# Patient Record
Sex: FEMALE | URBAN - METROPOLITAN AREA
[De-identification: names, ages, dates, MRNs, and addresses within clinical notes are randomized per-mention and may not be internally consistent; named-entity substitution may affect disease eponyms.]

---

## 2023-05-23 ENCOUNTER — TELEPHONE ENCOUNTER (OUTPATIENT)
Dept: URBAN - METROPOLITAN AREA CLINIC 64 | Facility: CLINIC | Age: 48
End: 2023-05-23

## 2023-05-23 ENCOUNTER — P2P PATIENT RECORD (OUTPATIENT)
Age: 48
End: 2023-05-23

## 2023-06-05 ENCOUNTER — OFFICE VISIT (OUTPATIENT)
Dept: URBAN - METROPOLITAN AREA CLINIC 60 | Facility: CLINIC | Age: 48
End: 2023-06-05
Payer: COMMERCIAL

## 2023-06-05 ENCOUNTER — LAB OUTSIDE AN ENCOUNTER (OUTPATIENT)
Dept: URBAN - METROPOLITAN AREA CLINIC 60 | Facility: CLINIC | Age: 48
End: 2023-06-05

## 2023-06-05 VITALS
TEMPERATURE: 97.4 F | HEIGHT: 64 IN | HEART RATE: 114 BPM | OXYGEN SATURATION: 97 % | WEIGHT: 243 LBS | SYSTOLIC BLOOD PRESSURE: 120 MMHG | DIASTOLIC BLOOD PRESSURE: 70 MMHG | BODY MASS INDEX: 41.48 KG/M2 | RESPIRATION RATE: 20 BRPM

## 2023-06-05 DIAGNOSIS — Z12.11 COLON CANCER SCREENING: ICD-10-CM

## 2023-06-05 DIAGNOSIS — D64.9 CHRONIC ANEMIA: ICD-10-CM

## 2023-06-05 DIAGNOSIS — K59.09 OTHER CONSTIPATION: ICD-10-CM

## 2023-06-05 DIAGNOSIS — K64.8 OTHER HEMORRHOIDS: ICD-10-CM

## 2023-06-05 PROCEDURE — 99204 OFFICE O/P NEW MOD 45 MIN: CPT | Performed by: PHYSICIAN ASSISTANT

## 2023-06-05 RX ORDER — ONDANSETRON HYDROCHLORIDE 4 MG/1
1 TABLET TABLET, FILM COATED ORAL
Qty: 2 | Refills: 0 | OUTPATIENT
Start: 2023-06-05

## 2023-06-05 RX ORDER — VILOXAZINE HYDROCHLORIDE 150 MG/1
2 CAPSULES CAPSULE, EXTENDED RELEASE ORAL ONCE A DAY
Status: ACTIVE | COMMUNITY

## 2023-06-05 RX ORDER — HYDROCHLOROTHIAZIDE 12.5 MG/1
1 CAPSULE IN THE MORNING CAPSULE, GELATIN COATED ORAL ONCE A DAY
Status: ACTIVE | COMMUNITY

## 2023-06-05 RX ORDER — LIDOCAINE 50 MG/G
1 APPLICATION AS NEEDED CREAM TOPICAL
Qty: 1 TUBE | Refills: 1 | OUTPATIENT
Start: 2023-06-05 | End: 2023-08-04

## 2023-06-05 RX ORDER — FERROUS SULFATE 325(65) MG
1 TABLET TABLET ORAL
Status: ACTIVE | COMMUNITY

## 2023-06-05 RX ORDER — TRAZODONE HYDROCHLORIDE 150 MG/1
1 TABLET AT BEDTIME TABLET ORAL ONCE A DAY
Status: ACTIVE | COMMUNITY

## 2023-06-05 RX ORDER — SERTRALINE 100 MG/1
1 TABLET TABLET, FILM COATED ORAL TWICE A DAY
Status: ACTIVE | COMMUNITY

## 2023-06-05 RX ORDER — MULTIVITAMIN
1 TABLET TABLET ORAL ONCE A DAY
Status: ACTIVE | COMMUNITY

## 2023-06-05 RX ORDER — POLYETHYLENE GLYCOL 3350, SODIUM CHLORIDE, SODIUM BICARBONATE, POTASSIUM CHLORIDE 420; 11.2; 5.72; 1.48 G/4L; G/4L; G/4L; G/4L
AS DIRECTED POWDER, FOR SOLUTION ORAL ONCE
Qty: 4000 | Refills: 0 | OUTPATIENT
Start: 2023-06-05 | End: 2023-06-06

## 2023-06-05 NOTE — PHYSICAL EXAM CONSTITUTIONAL:
well developed, morbidly obese, in no acute distress , ambulating without difficulty , normal communication ability

## 2023-06-05 NOTE — HPI-HPI
Niesha is a pleasant 47-year-old female who presents today for evaluation of constipation, anemia, hemorrhoids and a screening colonoscopy.  Presented to the urgent care on 3/24/2023 due to symptomatic external hemorrhoids.  Labs dated 5/12/2023 showed ferritin 7 (L).  Normal iron studies.  Normal renal function.  Normal LFTs.  Patient is EGD and colonoscopy naive. History of gastric bypass in 2003. Notes a bowel movement every few days. Pushing and straining. Minimal efficacy with a high fiber diet and fiber supplements. Admits to adequate hydration. Denies nausea, vomiting, heartburn, reflux, dysphagia, odynophagia, hematemesis, coffee ground emesis, abdominal pain, abnormal weight loss or melena. Occasional BRBPR when wiping. Denies family history of colon cancer or colon polyps. No other GI complaints at this time

## 2023-06-12 ENCOUNTER — LAB OUTSIDE AN ENCOUNTER (OUTPATIENT)
Dept: URBAN - METROPOLITAN AREA CLINIC 60 | Facility: CLINIC | Age: 48
End: 2023-06-12

## 2023-06-19 ENCOUNTER — TELEPHONE ENCOUNTER (OUTPATIENT)
Dept: URBAN - METROPOLITAN AREA CLINIC 60 | Facility: CLINIC | Age: 48
End: 2023-06-19

## 2023-07-11 ENCOUNTER — CLAIMS CREATED FROM THE CLAIM WINDOW (OUTPATIENT)
Dept: URBAN - METROPOLITAN AREA SURGERY CENTER 4 | Facility: SURGERY CENTER | Age: 48
End: 2023-07-11
Payer: COMMERCIAL

## 2023-07-11 ENCOUNTER — TELEPHONE ENCOUNTER (OUTPATIENT)
Dept: URBAN - METROPOLITAN AREA CLINIC 63 | Facility: CLINIC | Age: 48
End: 2023-07-11

## 2023-07-11 VITALS — HEIGHT: 64 IN

## 2023-07-11 DIAGNOSIS — K20.90 ESOPHAGITIS: ICD-10-CM

## 2023-07-11 DIAGNOSIS — D12.7 BENIGN NEOPLASM OF RECTOSIGMOID JUNCTION: ICD-10-CM

## 2023-07-11 DIAGNOSIS — D50.9 ANEMIA: ICD-10-CM

## 2023-07-11 DIAGNOSIS — K64.0 GRADE I HEMORRHOIDS: ICD-10-CM

## 2023-07-11 DIAGNOSIS — K62.89 OTHER SPECIFIED DISEASES OF ANUS AND RECTUM: ICD-10-CM

## 2023-07-11 DIAGNOSIS — Z12.11 COLON CANCER SCREENING: ICD-10-CM

## 2023-07-11 DIAGNOSIS — K22.82 ESOPHAGOGASTRIC JUNCTION POLYP: ICD-10-CM

## 2023-07-11 DIAGNOSIS — K63.89 OTHER SPECIFIED DISEASES OF INTESTINE: ICD-10-CM

## 2023-07-11 DIAGNOSIS — K44.9 HIATAL HERNIA: ICD-10-CM

## 2023-07-11 PROCEDURE — 00813 ANES UPR LWR GI NDSC PX: CPT | Performed by: NURSE ANESTHETIST, CERTIFIED REGISTERED

## 2023-07-11 PROCEDURE — 45380 COLONOSCOPY AND BIOPSY: CPT | Performed by: INTERNAL MEDICINE

## 2023-07-11 PROCEDURE — 43239 EGD BIOPSY SINGLE/MULTIPLE: CPT | Performed by: INTERNAL MEDICINE

## 2023-07-11 RX ORDER — MULTIVITAMIN
1 TABLET TABLET ORAL ONCE A DAY
Status: ACTIVE | COMMUNITY

## 2023-07-11 RX ORDER — ONDANSETRON HYDROCHLORIDE 4 MG/1
1 TABLET TABLET, FILM COATED ORAL
Qty: 2 | Refills: 0 | Status: ACTIVE | COMMUNITY
Start: 2023-06-05

## 2023-07-11 RX ORDER — HYDROCHLOROTHIAZIDE 12.5 MG/1
1 CAPSULE IN THE MORNING CAPSULE, GELATIN COATED ORAL ONCE A DAY
Status: ACTIVE | COMMUNITY

## 2023-07-11 RX ORDER — TRAZODONE HYDROCHLORIDE 150 MG/1
1 TABLET AT BEDTIME TABLET ORAL ONCE A DAY
Status: ACTIVE | COMMUNITY

## 2023-07-11 RX ORDER — VILOXAZINE HYDROCHLORIDE 150 MG/1
2 CAPSULES CAPSULE, EXTENDED RELEASE ORAL ONCE A DAY
Status: ACTIVE | COMMUNITY

## 2023-07-11 RX ORDER — SERTRALINE 100 MG/1
1 TABLET TABLET, FILM COATED ORAL TWICE A DAY
Status: ACTIVE | COMMUNITY

## 2023-07-11 RX ORDER — LIDOCAINE 50 MG/G
1 APPLICATION AS NEEDED CREAM TOPICAL
Qty: 1 TUBE | Refills: 1 | Status: ACTIVE | COMMUNITY
Start: 2023-06-05 | End: 2023-08-04

## 2023-07-11 RX ORDER — FAMOTIDINE 40 MG/1
1 TABLET AT BEDTIME TABLET, FILM COATED ORAL ONCE A DAY
Qty: 30 | Refills: 3 | OUTPATIENT
Start: 2023-07-11

## 2023-07-11 RX ORDER — FERROUS SULFATE 325(65) MG
1 TABLET TABLET ORAL
Status: ACTIVE | COMMUNITY

## 2023-07-20 ENCOUNTER — TELEPHONE ENCOUNTER (OUTPATIENT)
Dept: URBAN - METROPOLITAN AREA CLINIC 63 | Facility: CLINIC | Age: 48
End: 2023-07-20

## 2023-07-20 ENCOUNTER — LAB OUTSIDE AN ENCOUNTER (OUTPATIENT)
Dept: URBAN - METROPOLITAN AREA CLINIC 63 | Facility: CLINIC | Age: 48
End: 2023-07-20

## 2023-07-20 LAB
FERRITIN, SERUM: 8
HEMATOCRIT: 39.3
HEMOGLOBIN: 12.6
IRON BIND.CAP.(TIBC): 421
IRON SATURATION: 9
IRON: 39
MCH: 24.4
MCHC: 32.1
MCV: 76
MPV: 9.6
PLATELET COUNT: 270
RDW: 14.5
RED BLOOD CELL COUNT: 5.17
TSH: 1.23
VITAMIN B12: 389
WHITE BLOOD CELL COUNT: 7.6

## 2023-07-31 ENCOUNTER — OFFICE VISIT (OUTPATIENT)
Dept: URBAN - METROPOLITAN AREA CLINIC 60 | Facility: CLINIC | Age: 48
End: 2023-07-31
Payer: COMMERCIAL

## 2023-07-31 VITALS
HEART RATE: 112 BPM | OXYGEN SATURATION: 98 % | TEMPERATURE: 97.9 F | RESPIRATION RATE: 20 BRPM | WEIGHT: 252 LBS | HEIGHT: 64 IN | SYSTOLIC BLOOD PRESSURE: 130 MMHG | BODY MASS INDEX: 43.02 KG/M2 | DIASTOLIC BLOOD PRESSURE: 80 MMHG

## 2023-07-31 DIAGNOSIS — Z12.11 COLON CANCER SCREENING: ICD-10-CM

## 2023-07-31 DIAGNOSIS — K20.90 ESOPHAGITIS: ICD-10-CM

## 2023-07-31 DIAGNOSIS — K64.8 OTHER HEMORRHOIDS: ICD-10-CM

## 2023-07-31 DIAGNOSIS — K59.09 OTHER CONSTIPATION: ICD-10-CM

## 2023-07-31 PROCEDURE — 99213 OFFICE O/P EST LOW 20 MIN: CPT | Performed by: PHYSICIAN ASSISTANT

## 2023-07-31 RX ORDER — VILOXAZINE HYDROCHLORIDE 150 MG/1
2 CAPSULES CAPSULE, EXTENDED RELEASE ORAL ONCE A DAY
Status: ACTIVE | COMMUNITY

## 2023-07-31 RX ORDER — HYDROCHLOROTHIAZIDE 12.5 MG/1
1 CAPSULE IN THE MORNING CAPSULE, GELATIN COATED ORAL ONCE A DAY
Status: ACTIVE | COMMUNITY

## 2023-07-31 RX ORDER — LIDOCAINE 50 MG/G
1 APPLICATION AS NEEDED CREAM TOPICAL
Qty: 1 TUBE | Refills: 1 | OUTPATIENT

## 2023-07-31 RX ORDER — TRAZODONE HYDROCHLORIDE 150 MG/1
1 TABLET AT BEDTIME TABLET ORAL ONCE A DAY
Status: ACTIVE | COMMUNITY

## 2023-07-31 RX ORDER — SERTRALINE 100 MG/1
1 TABLET TABLET, FILM COATED ORAL TWICE A DAY
Status: ACTIVE | COMMUNITY

## 2023-07-31 RX ORDER — FAMOTIDINE 40 MG/1
1 TABLET AT BEDTIME TABLET, FILM COATED ORAL ONCE A DAY
Qty: 30 | Refills: 3 | Status: ACTIVE | COMMUNITY
Start: 2023-07-11

## 2023-07-31 RX ORDER — LIDOCAINE 50 MG/G
1 APPLICATION AS NEEDED CREAM TOPICAL
Qty: 1 TUBE | Refills: 1 | Status: ACTIVE | COMMUNITY
Start: 2023-06-05 | End: 2023-08-04

## 2023-07-31 RX ORDER — FERROUS SULFATE 325(65) MG
1 TABLET TABLET ORAL
Status: ACTIVE | COMMUNITY

## 2023-07-31 RX ORDER — MULTIVITAMIN
1 TABLET TABLET ORAL ONCE A DAY
Status: ACTIVE | COMMUNITY

## 2023-07-31 NOTE — HPI-HPI
Niesha is a pleasant 47-year-old female who presents today for a procedure follow up. Seen last visit in evaluation of constipation, anemia, hemorrhoids and a screening colonoscopy.    EGD dated 7/11/2023 showed nonsevere esophagitis.  Small hiatal hernia.  Gulshan-en-Y gastrojejunostomy with gastrojejunal anastomosis characterized by healthy-appearing mucosa.  Normal examined jejunum.  Gastric pouch approximately 7 cm. GE junction type mucosa demonstrating mild chronic inflammation.  Colonoscopy dated 7/11/2023 showed nonbleeding internal hemorrhoids.  Anal papilla were hypertrophied and biopsied for histology.  Diminutive polyp removed from the rectosigmoid colon. Polypoid benign mucosa.  No definitive dysplasia or malignancy. Diverticulosis in the cecum.  Consider referral to colorectal surgery for further evaluation of hypertrophied papilla.  Fragments of squamous mucosa with papillomatous features negative for high-grade dysplasia or carcinoma noted in the rectal biopsies.  Labs dated 7/20/2023 showed RBC 5.17.  Hemoglobin normal.  MCV 76.  Normal platelets.  Ferritin 8.  Iron saturation 9%.  Total iron 39.  Normal TSH.  Vitamin B12 normal  Labs dated 5/12/2023 showed ferritin 7 (L).  Normal iron studies.  Normal renal function.  Normal LFTs.  No issues after procedure. S/P gastric bypass in 2003. Notes a bowel movement every few days despite supplemental fiber and MiraLAX. Pushing and straining. Appt with CRS pending. Admits to nausea but denies vomiting, heartburn, reflux, dysphagia, odynophagia, hematemesis, coffee ground emesis, abdominal pain, abnormal weight loss or melena. Occasional BRBPR when wiping. Denies family history of colon cancer or colon polyps. No other GI complaints at this time

## 2023-09-25 ENCOUNTER — OFFICE VISIT (OUTPATIENT)
Dept: URBAN - METROPOLITAN AREA CLINIC 60 | Facility: CLINIC | Age: 48
End: 2023-09-25

## 2023-09-25 RX ORDER — MULTIVITAMIN
1 TABLET TABLET ORAL ONCE A DAY
Status: ACTIVE | COMMUNITY

## 2023-09-25 RX ORDER — FAMOTIDINE 40 MG/1
1 TABLET AT BEDTIME TABLET, FILM COATED ORAL ONCE A DAY
Qty: 30 | Refills: 3 | Status: ACTIVE | COMMUNITY
Start: 2023-07-11

## 2023-09-25 RX ORDER — VILOXAZINE HYDROCHLORIDE 150 MG/1
2 CAPSULES CAPSULE, EXTENDED RELEASE ORAL ONCE A DAY
Status: ACTIVE | COMMUNITY

## 2023-09-25 RX ORDER — LIDOCAINE 50 MG/G
1 APPLICATION AS NEEDED CREAM TOPICAL
Qty: 1 TUBE | Refills: 1 | OUTPATIENT

## 2023-09-25 RX ORDER — FERROUS SULFATE 325(65) MG
1 TABLET TABLET ORAL
Status: ACTIVE | COMMUNITY

## 2023-09-25 RX ORDER — HYDROCHLOROTHIAZIDE 12.5 MG/1
1 CAPSULE IN THE MORNING CAPSULE, GELATIN COATED ORAL ONCE A DAY
Status: ACTIVE | COMMUNITY

## 2023-09-25 RX ORDER — LIDOCAINE 50 MG/G
1 APPLICATION AS NEEDED CREAM TOPICAL
Qty: 1 TUBE | Refills: 1 | Status: ACTIVE | COMMUNITY

## 2023-09-25 RX ORDER — SERTRALINE 100 MG/1
1 TABLET TABLET, FILM COATED ORAL TWICE A DAY
Status: ACTIVE | COMMUNITY

## 2023-09-25 RX ORDER — TRAZODONE HYDROCHLORIDE 150 MG/1
1 TABLET AT BEDTIME TABLET ORAL ONCE A DAY
Status: ACTIVE | COMMUNITY

## 2023-09-25 NOTE — HPI-HPI
Niesha is a pleasant 47-year-old female who presents today for a follow up.   EGD dated 7/11/2023 showed nonsevere esophagitis.  Small hiatal hernia.  Gulshan-en-Y gastrojejunostomy with gastrojejunal anastomosis characterized by healthy-appearing mucosa.  Normal examined jejunum.  Gastric pouch approximately 7 cm. GE junction type mucosa demonstrating mild chronic inflammation.  Colonoscopy dated 7/11/2023 showed nonbleeding internal hemorrhoids.  Anal papilla were hypertrophied and biopsied for histology.  Diminutive polyp removed from the rectosigmoid colon. Polypoid benign mucosa.  No definitive dysplasia or malignancy. Diverticulosis in the cecum.  Consider referral to colorectal surgery for further evaluation of hypertrophied papilla.  Fragments of squamous mucosa with papillomatous features negative for high-grade dysplasia or carcinoma noted in the rectal biopsies.  Labs dated 7/20/2023 showed RBC 5.17.  Hemoglobin normal.  MCV 76.  Normal platelets.  Ferritin 8.  Iron saturation 9%.  Total iron 39.  Normal TSH.  Vitamin B12 normal  Labs dated 5/12/2023 showed ferritin 7 (L).  Normal iron studies.  Normal renal function.  Normal LFTs.  Last visit given trial of Linzess 72 mcg. Patient never called to report efficacy. S/P gastric bypass in 2003. Notes a bowel movement every few days despite supplemental fiber and MiraLAX. Pushing and straining. Appt with CRS pending. Admits to nausea but denies vomiting, heartburn, reflux, dysphagia, odynophagia, hematemesis, coffee ground emesis, abdominal pain, abnormal weight loss or melena. Occasional BRBPR when wiping. Denies family history of colon cancer or colon polyps. No other GI complaints at this time

## 2023-10-16 ENCOUNTER — DASHBOARD ENCOUNTERS (OUTPATIENT)
Age: 48
End: 2023-10-16

## 2023-10-16 ENCOUNTER — OFFICE VISIT (OUTPATIENT)
Dept: URBAN - METROPOLITAN AREA CLINIC 60 | Facility: CLINIC | Age: 48
End: 2023-10-16
Payer: COMMERCIAL

## 2023-10-16 VITALS
TEMPERATURE: 97.7 F | HEART RATE: 92 BPM | SYSTOLIC BLOOD PRESSURE: 140 MMHG | OXYGEN SATURATION: 98 % | DIASTOLIC BLOOD PRESSURE: 90 MMHG | HEIGHT: 64 IN | RESPIRATION RATE: 12 BRPM | WEIGHT: 251.2 LBS | BODY MASS INDEX: 42.88 KG/M2

## 2023-10-16 DIAGNOSIS — E61.1 IRON DEFICIENCY: ICD-10-CM

## 2023-10-16 DIAGNOSIS — K64.8 OTHER HEMORRHOIDS: ICD-10-CM

## 2023-10-16 DIAGNOSIS — K20.90 ESOPHAGITIS: ICD-10-CM

## 2023-10-16 DIAGNOSIS — K59.09 OTHER CONSTIPATION: ICD-10-CM

## 2023-10-16 PROCEDURE — 99214 OFFICE O/P EST MOD 30 MIN: CPT | Performed by: PHYSICIAN ASSISTANT

## 2023-10-16 RX ORDER — MULTIVITAMIN
1 TABLET TABLET ORAL ONCE A DAY
Status: ACTIVE | COMMUNITY

## 2023-10-16 RX ORDER — LIDOCAINE 50 MG/G
1 APPLICATION AS NEEDED CREAM TOPICAL
Qty: 1 TUBE | Refills: 1 | Status: ACTIVE | COMMUNITY

## 2023-10-16 RX ORDER — FERROUS SULFATE 325(65) MG
1 TABLET TABLET ORAL
Status: ACTIVE | COMMUNITY

## 2023-10-16 RX ORDER — HYDROCHLOROTHIAZIDE 12.5 MG/1
1 CAPSULE IN THE MORNING CAPSULE, GELATIN COATED ORAL ONCE A DAY
Status: ACTIVE | COMMUNITY

## 2023-10-16 RX ORDER — VILOXAZINE HYDROCHLORIDE 150 MG/1
2 CAPSULES CAPSULE, EXTENDED RELEASE ORAL ONCE A DAY
Status: ACTIVE | COMMUNITY

## 2023-10-16 RX ORDER — FAMOTIDINE 40 MG/1
1 TABLET AT BEDTIME TABLET, FILM COATED ORAL ONCE A DAY
Qty: 30 | Refills: 3 | Status: ACTIVE | COMMUNITY
Start: 2023-07-11

## 2023-10-16 RX ORDER — TRAZODONE HYDROCHLORIDE 150 MG/1
1 TABLET AT BEDTIME TABLET ORAL ONCE A DAY
Status: ACTIVE | COMMUNITY

## 2023-10-16 RX ORDER — SERTRALINE 100 MG/1
1 TABLET TABLET, FILM COATED ORAL TWICE A DAY
Status: ACTIVE | COMMUNITY

## 2023-10-16 NOTE — HPI-HPI
Niesha is a pleasant 48-year-old female who presents today for a follow up.   EGD dated 7/11/2023 showed nonsevere esophagitis.  Small hiatal hernia.  Gulshan-en-Y gastrojejunostomy with gastrojejunal anastomosis characterized by healthy-appearing mucosa.  Normal examined jejunum.  Gastric pouch approximately 7 cm. GE junction type mucosa demonstrating mild chronic inflammation.  Colonoscopy dated 7/11/2023 showed nonbleeding internal hemorrhoids.  Anal papilla were hypertrophied and biopsied for histology.  Diminutive polyp removed from the rectosigmoid colon. Polypoid benign mucosa.  No definitive dysplasia or malignancy. Diverticulosis in the cecum.  Consider referral to colorectal surgery for further evaluation of hypertrophied papilla.  Fragments of squamous mucosa with papillomatous features negative for high-grade dysplasia or carcinoma noted in the rectal biopsies.  Labs dated 7/20/2023 showed RBC 5.17.  Hemoglobin normal.  MCV 76.  Normal platelets.  Ferritin 8.  Iron saturation 9%.  Total iron 39.  Normal TSH.  Vitamin B12 normal  Labs dated 5/12/2023 showed ferritin 7 (L).  Normal iron studies.  Normal renal function.  Normal LFTs.  Completed iron infusions. S/P gastric bypass in 2003. Notes a bowel movement every day since stopping iron pills. No longer using Linzess. Appt with CRS pending. Doing well on famotidine 40 mg. Denies nausea, vomiting, heartburn, reflux, dysphagia, odynophagia, hematemesis, coffee ground emesis, abdominal pain, abnormal weight loss, BRBPR or melena. Denies family history of colon cancer or colon polyps. No other GI complaints at this time

## 2023-10-24 ENCOUNTER — ERX REFILL RESPONSE (OUTPATIENT)
Dept: URBAN - METROPOLITAN AREA CLINIC 63 | Facility: CLINIC | Age: 48
End: 2023-10-24

## 2023-10-24 RX ORDER — FAMOTIDINE 40 MG/1
TAKE 1 TABLET BY MOUTH EVERY DAY AT BEDTIME TABLET, FILM COATED ORAL
Qty: 30 TABLET | Refills: 0 | OUTPATIENT

## 2023-10-24 RX ORDER — FAMOTIDINE 40 MG/1
1 TABLET AT BEDTIME TABLET, FILM COATED ORAL ONCE A DAY
Qty: 30 | Refills: 3 | OUTPATIENT

## 2023-11-18 LAB
FERRITIN, SERUM: 61
HEMATOCRIT: 42.2
HEMOGLOBIN: 14.2
IRON BIND.CAP.(TIBC): 334
IRON SATURATION: 22
IRON: 72
MCH: 26.3
MCHC: 33.6
MCV: 78.1
MPV: 10.2
PLATELET COUNT: 291
RDW: 15.6
RED BLOOD CELL COUNT: 5.4
VITAMIN B12: 360
WHITE BLOOD CELL COUNT: 9.8

## 2023-12-04 ENCOUNTER — ERX REFILL RESPONSE (OUTPATIENT)
Dept: URBAN - METROPOLITAN AREA CLINIC 63 | Facility: CLINIC | Age: 48
End: 2023-12-04

## 2023-12-04 RX ORDER — FAMOTIDINE 40 MG/1
TAKE 1 TABLET BY MOUTH EVERY DAY AT BEDTIME TABLET, FILM COATED ORAL
Qty: 30 TABLET | Refills: 0 | OUTPATIENT

## 2023-12-18 ENCOUNTER — OFFICE VISIT (OUTPATIENT)
Dept: URBAN - METROPOLITAN AREA CLINIC 60 | Facility: CLINIC | Age: 48
End: 2023-12-18